# Patient Record
Sex: MALE | Race: WHITE | Employment: FULL TIME | URBAN - METROPOLITAN AREA
[De-identification: names, ages, dates, MRNs, and addresses within clinical notes are randomized per-mention and may not be internally consistent; named-entity substitution may affect disease eponyms.]

---

## 2023-02-14 ENCOUNTER — OFFICE VISIT (OUTPATIENT)
Dept: PRIMARY CARE CLINIC | Age: 48
End: 2023-02-14
Payer: COMMERCIAL

## 2023-02-14 VITALS
HEIGHT: 68 IN | DIASTOLIC BLOOD PRESSURE: 86 MMHG | RESPIRATION RATE: 16 BRPM | SYSTOLIC BLOOD PRESSURE: 122 MMHG | WEIGHT: 166 LBS | HEART RATE: 80 BPM | TEMPERATURE: 97.5 F | OXYGEN SATURATION: 98 % | BODY MASS INDEX: 25.16 KG/M2

## 2023-02-14 DIAGNOSIS — Z11.59 ENCOUNTER FOR HEPATITIS C SCREENING TEST FOR LOW RISK PATIENT: ICD-10-CM

## 2023-02-14 DIAGNOSIS — Z13.1 DIABETES MELLITUS SCREENING: ICD-10-CM

## 2023-02-14 DIAGNOSIS — Z12.11 SPECIAL SCREENING FOR MALIGNANT NEOPLASMS, COLON: ICD-10-CM

## 2023-02-14 DIAGNOSIS — Z23 IMMUNIZATION DUE: ICD-10-CM

## 2023-02-14 DIAGNOSIS — Z11.4 ENCOUNTER FOR SCREENING FOR HIV: ICD-10-CM

## 2023-02-14 DIAGNOSIS — Z13.220 SCREENING CHOLESTEROL LEVEL: ICD-10-CM

## 2023-02-14 DIAGNOSIS — Z00.00 HEALTHCARE MAINTENANCE: Primary | ICD-10-CM

## 2023-02-14 PROCEDURE — 90471 IMMUNIZATION ADMIN: CPT | Performed by: FAMILY MEDICINE

## 2023-02-14 PROCEDURE — G8484 FLU IMMUNIZE NO ADMIN: HCPCS | Performed by: FAMILY MEDICINE

## 2023-02-14 PROCEDURE — 99386 PREV VISIT NEW AGE 40-64: CPT | Performed by: FAMILY MEDICINE

## 2023-02-14 PROCEDURE — 90715 TDAP VACCINE 7 YRS/> IM: CPT | Performed by: FAMILY MEDICINE

## 2023-02-14 ASSESSMENT — PATIENT HEALTH QUESTIONNAIRE - PHQ9
SUM OF ALL RESPONSES TO PHQ QUESTIONS 1-9: 0
SUM OF ALL RESPONSES TO PHQ QUESTIONS 1-9: 0
SUM OF ALL RESPONSES TO PHQ9 QUESTIONS 1 & 2: 0
SUM OF ALL RESPONSES TO PHQ QUESTIONS 1-9: 0
1. LITTLE INTEREST OR PLEASURE IN DOING THINGS: 0
2. FEELING DOWN, DEPRESSED OR HOPELESS: 0
SUM OF ALL RESPONSES TO PHQ QUESTIONS 1-9: 0

## 2023-02-14 ASSESSMENT — ENCOUNTER SYMPTOMS
COUGH: 0
DIARRHEA: 0
SHORTNESS OF BREATH: 0
VOMITING: 0
RHINORRHEA: 0
BLOOD IN STOOL: 0
NAUSEA: 0

## 2023-02-14 NOTE — PATIENT INSTRUCTIONS
For help and support with [employer] hugo/portal set-up, please call [phone]. Zevia soda    Please find our [facility] below for your convenience! CENTRAL LOCATIONS    1) [address]  [address] [address] [place], [address]  Phone: [phone]    2) [address] [facility]. [address]  [facility], [address]  Phone: [place]    3) [address], [address]  [place] [address] [address]  Phone: [phone]    9) [facility], [address]  Phone: [phone]    5) [facility] [address]  [place] [address]  Phone: [address]    6) [address] [facility], [address] [facility], [address]  Phone: [phone]    7) Dario Angulo [address] [facility] [address]  Phone: [phone]) [facility]  [address]  [place], [address]  Phone: [phone]) [address] [place] [address] 189, [address]. [facility], [address] [facility]  Phone: [phone], [address]  Phone: [address] [place]    11) [facility]  [address], [address] [facility]  Phone: [phone] [facility]  3/3/2000 [address] [facility], [address] [facility] 429  Phone: [address]) [address] [facility] 15, [address]  [address], [address] [facility]  Phone: [phone]) [facility]   [facility], [address] [facility]  Phone: [phone]

## 2023-02-14 NOTE — ASSESSMENT & PLAN NOTE
Overall doing well. Age appropriate screenings and immunizations provided as per orders below. Completed paperwork to classify as low risk for diving - see scanned to Media.

## 2023-02-14 NOTE — PROGRESS NOTES
Hannah Galeana is a 53y.o. year old male here for:    Chief Complaint:    Chief Complaint   Patient presents with    [facility]    Annual Exam     Subjective: Today, his current concerns include:    Preventive Services:    Health Maintenance History:  Patient exercises regularly? Yes, martial arts 2X/week, weight training 1X/week and cardio 1-2X/week, at least 1 hour each time  Diet? Tries to eat a healthy diet, some soda, lots of water  Dental: Last visit was 3 months ago  Glasses/Eyes: Last visit was 1 year ago, has upcoming appt March 9 (about 2 weeks)    Other Health Maintenance History:  Sexually active? Yes, with one monogamous female partner  In the past two weeks have they been bothered by feeling \"down\", depressed or hopeless?  no  In the past two weeks, have they experienced a loss of interest or pleasure in doing things?  no  In the last year, have you fallen more than once or been seriously injured in a fall?  no  Advance Directives: Not in place. Provided instructions today on how to sign up/provide these on [employer]. Health Maintenance Screening:  Diabetes screening: was provided today  Cholesterol screening: was provided today  Prostate cancer screening order: was not applicable to this patient based on their risk factors and evidence based recommendations (no personal history of prostate CA, fam hx as noted) today. The natural history of prostate cancer and ongoing controversy regarding screening and potential treatment outcomes of prostate cancer has been discussed with the patient. The meaning of a false positive PSA and a false negative PSA has been discussed.    AAA Screening: was not applicable to this patient based on their risk factors and evidence based recommendations today  Colorectal cancer screening (Screening colonoscopy) order: was not applicable to this patient based on their risk factors and evidence based recommendations (no personal or family history of colon CA) today  Lung Cancer Screen:was not applicable to this patient based on their risk factors and evidence based recommendations today  Hep C screening: was provided today  HIV screening: was provided today    Immunizations:   Pneumonia vaccine: was not applicable to this patient based on their risk factors and evidence based recommendations today  TDAP vaccine: was provided today  Flu vaccine: declined today, counseling provided and advised that should she want this, we could provide at a later date. today  Hepatitis B vaccine: declined today, counseling provided and advised that should she want this, we could provide at a later date. today  Shingles vaccine: was not applicable to this patient based on their risk factors and evidence based recommendations today  HPV vaccine: was not applicable to this patient based on their risk factors and evidence based recommendations today    Functional Assessment  1. Do you have problems with hearing?  no  2. Do you have problems with vision?  no  3. Get Up and Go:  8 seconds   Pushes to get out of chair  no   Wide based gait  no   Unsteady/poor balance no   Fall Risk assessment low  (If there are issues regarding risk, these will be addressed in the assessment section.)    Past Medical History:   Diagnosis Date    Hx of concussion     at age 7     Social History     Tobacco Use    Smoking status: Never    Smokeless tobacco: Never   Vaping Use    Vaping Use: Never used   Substance Use Topics    Alcohol use: Yes     Comment: Very rarely    Drug use: Never     Family History   Problem Relation Age of Onset    Prostate Cancer Maternal Grandfather         Diagnosed in early 76s    Colon Cancer Neg Hx      Past Surgical History:   Procedure Laterality Date    HAND SURGERY Right     WISDOM TOOTH EXTRACTION       No current outpatient medications on file. No Known Allergies    Review of Systems:  Review of Systems   Constitutional:  Negative for chills, diaphoresis and fever.    HENT: Negative for congestion and rhinorrhea. Respiratory:  Negative for cough and shortness of breath. Cardiovascular:  Negative for chest pain. Gastrointestinal:  Negative for blood in stool, diarrhea, nausea and vomiting. Genitourinary:  Negative for difficulty urinating and hematuria. Skin:  Negative for rash. Neurological:  Negative for dizziness and syncope. Psychiatric/Behavioral:  Negative for dysphoric mood. Objective:    Physical Exam:  Vitals:    02/14/23 1554   BP: 122/86   Site: Right Upper Arm   Position: Sitting   Cuff Size: Medium Adult   Pulse: 80   Resp: 16   Temp: 97.5 °F (36.4 °C)   TempSrc: Temporal   SpO2: 98%   Weight: 166 lb (75.3 kg)   Height: 5' 8.25\" (1.734 m)     Physical Exam  Constitutional:       General: He is not in acute distress. Appearance: He is not ill-appearing, toxic-appearing or diaphoretic. HENT:      Head: Normocephalic and atraumatic. Right Ear: External ear normal.      Left Ear: External ear normal.   Eyes:      General: No scleral icterus. Cardiovascular:      Rate and Rhythm: Normal rate and regular rhythm. Heart sounds: Normal heart sounds. No murmur heard. No friction rub. No gallop. Pulmonary:      Effort: No respiratory distress. Breath sounds: No stridor. No wheezing, rhonchi or rales. Chest:      Chest wall: No tenderness. Skin:     General: Skin is warm and dry. Capillary Refill: Capillary refill takes less than 2 seconds. Neurological:      Mental Status: He is alert. Psychiatric:         Mood and Affect: Mood normal.         Behavior: Behavior normal.     Body mass index is 25.06 kg/m². Labs:  No results found for this or any previous visit (from the past 672 hour(s)). Imaging:  No orders to display     ASSESSMENT & PLAN:    Amadou Weber is a 53y.o. year old male here for an annual exam. The following is a summary of the plan made at this visit:    1.  Healthcare maintenance  Assessment & Plan:  Overall doing well. Age appropriate screenings and immunizations provided as per orders below. Completed paperwork to classify as low risk for diving - see scanned to Media. 2. Encounter for screening for HIV  Assessment & Plan:  Age appropriate screening provided as per orders below. Orders:  -     HIV Screen; Future  3. Encounter for hepatitis C screening test for low risk patient  Assessment & Plan:  Age appropriate screening provided as per orders below. Orders:  -     Hepatitis C Antibody; Future  4. Diabetes mellitus screening  Assessment & Plan:  Age appropriate screening provided as per orders below. Orders:  -     Comprehensive Metabolic Panel; Future  -     Hemoglobin A1C; Future  5. Screening cholesterol level  Assessment & Plan:  Age appropriate screening provided as per orders below. Orders:  -     Lipid Panel; Future  6. Special screening for malignant neoplasms, colon  Assessment & Plan:  Age appropriate screening provided as per orders below. Orders:  -     Lou Ruffin MD, Colorectal Surgery, [facility]  7. Immunization due  Assessment & Plan:  Age appropriate immunizations provided as per orders below. Orders:  -     Tdap, BOOSTRIX, (age 8 yrs+), IM    Education:  Routine anticipatory guidance provided. Other applicable patient education information provided in AVS.    Counseling  The patient was counseled regarding motor vehicle safety and sun exposure. If needed, the patient was counseled regarding diet and exercise. The patient was given information regarding the dangers of smoking and the overuse of alcohol. The patient was counseled regarding Living Will/Durable Power-Of-.

## 2023-03-02 ENCOUNTER — TELEPHONE (OUTPATIENT)
Dept: PRIMARY CARE CLINIC | Age: 48
End: 2023-03-02

## 2023-03-02 NOTE — TELEPHONE ENCOUNTER
Spoke with the patient, he has been travelling for work, will schedule colonoscopy when he is able to. He has their contact info. I will set reminder for 1 month from now, he was amenable to this.

## 2023-03-02 NOTE — TELEPHONE ENCOUNTER
Called patient to help facilitate colonoscopy scheduling. Called patient about this result, no answer received, message left asking for call back. Also left a msg with his wife asking him to call our office back. Routing to Annalise Galeas as RAJ.

## 2023-03-06 ENCOUNTER — TELEPHONE (OUTPATIENT)
Dept: SURGERY | Age: 48
End: 2023-03-06

## 2023-03-06 RX ORDER — POLYETHYLENE GLYCOL 3350, SODIUM SULFATE ANHYDROUS, SODIUM BICARBONATE, SODIUM CHLORIDE, POTASSIUM CHLORIDE 236; 22.74; 6.74; 5.86; 2.97 G/4L; G/4L; G/4L; G/4L; G/4L
POWDER, FOR SOLUTION ORAL
Qty: 1 EACH | Refills: 0 | Status: SHIPPED | OUTPATIENT
Start: 2023-03-06

## 2023-03-06 NOTE — TELEPHONE ENCOUNTER
Patient has been scheduled for:    Procedure:Colonoscopy   Date:5/3/23  Time:7:30 AM   Arrival:6:00 AM   [facility]  ASA? :None  Prep? Carlito, reviewed on phone, mailed     Pre-op? Day of     Post-op Appt? N/A     Patient advised they will need a . Orders faxed to surgery scheduling.     Instructions have been mailed/emailed to:   Douglas Castillo, [address]

## 2023-03-16 PROBLEM — Z00.00 HEALTHCARE MAINTENANCE: Status: RESOLVED | Noted: 2023-02-14 | Resolved: 2023-03-16

## 2023-03-16 PROBLEM — Z13.1 DIABETES MELLITUS SCREENING: Status: RESOLVED | Noted: 2023-02-14 | Resolved: 2023-03-16

## 2023-03-16 PROBLEM — Z11.4 ENCOUNTER FOR SCREENING FOR HIV: Status: RESOLVED | Noted: 2023-02-14 | Resolved: 2023-03-16

## 2023-03-16 PROBLEM — Z13.220 SCREENING CHOLESTEROL LEVEL: Status: RESOLVED | Noted: 2023-02-14 | Resolved: 2023-03-16

## 2023-03-16 PROBLEM — Z11.59 ENCOUNTER FOR HEPATITIS C SCREENING TEST FOR LOW RISK PATIENT: Status: RESOLVED | Noted: 2023-02-14 | Resolved: 2023-03-16

## 2023-03-16 PROBLEM — Z12.11 SPECIAL SCREENING FOR MALIGNANT NEOPLASMS, COLON: Status: RESOLVED | Noted: 2023-02-14 | Resolved: 2023-03-16

## 2023-04-25 ENCOUNTER — TELEPHONE (OUTPATIENT)
Dept: SURGERY | Age: 48
End: 2023-04-25

## 2023-04-25 DIAGNOSIS — Z12.12 ENCOUNTER FOR COLORECTAL CANCER SCREENING: Primary | ICD-10-CM

## 2023-04-25 DIAGNOSIS — Z12.11 ENCOUNTER FOR COLORECTAL CANCER SCREENING: Primary | ICD-10-CM

## 2023-04-25 RX ORDER — POLYETHYLENE GLYCOL 3350, SODIUM CHLORIDE, SODIUM BICARBONATE, POTASSIUM CHLORIDE 420; 11.2; 5.72; 1.48 G/4L; G/4L; G/4L; G/4L
POWDER, FOR SOLUTION ORAL
Qty: 1 EACH | Refills: 0 | Status: SHIPPED | OUTPATIENT
Start: 2023-04-25

## 2023-04-25 NOTE — TELEPHONE ENCOUNTER
Patient called to ask  to please submit the order for the prep, Golytely. Pharmacy on file has been verified; [facility] [address] [facility] and [address]    When submitted, please call the patient at [phone] to let him know when to pick it up.

## 2023-05-02 ENCOUNTER — TELEPHONE (OUTPATIENT)
Dept: SURGERY | Age: 48
End: 2023-05-02

## 2023-05-02 ENCOUNTER — ANESTHESIA EVENT (OUTPATIENT)
Dept: ENDOSCOPY | Age: 48
End: 2023-05-02
Payer: COMMERCIAL

## 2023-05-02 NOTE — TELEPHONE ENCOUNTER
I have placed a reminder call to patient for upcoming procedure. Did you speak directly to patient or leave a voicemail? Voicemail    Prep? Golytely    Must have a  that is over the age of 25. Must be a friend or family member that can be responsible for signing them out after surgery.     Arrive at the main entrance [facility] at 7:30am

## 2023-05-03 ENCOUNTER — HOSPITAL ENCOUNTER (OUTPATIENT)
Age: 48
Setting detail: OUTPATIENT SURGERY
Discharge: HOME | End: 2023-05-03
Attending: SURGERY | Admitting: SURGERY
Payer: COMMERCIAL

## 2023-05-03 ENCOUNTER — ANESTHESIA (OUTPATIENT)
Dept: ENDOSCOPY | Age: 48
End: 2023-05-03
Payer: COMMERCIAL

## 2023-05-03 VITALS
SYSTOLIC BLOOD PRESSURE: 134 MMHG | BODY MASS INDEX: 23.25 KG/M2 | HEART RATE: 88 BPM | OXYGEN SATURATION: 100 % | HEIGHT: 69 IN | WEIGHT: 157 LBS | RESPIRATION RATE: 18 BRPM | DIASTOLIC BLOOD PRESSURE: 96 MMHG | TEMPERATURE: 97 F

## 2023-05-03 PROBLEM — Z12.12 SCREENING FOR COLORECTAL CANCER: Status: ACTIVE | Noted: 2023-02-14

## 2023-05-03 PROCEDURE — 2500000003 HC RX 250 WO HCPCS: Performed by: NURSE ANESTHETIST, CERTIFIED REGISTERED

## 2023-05-03 PROCEDURE — 3609027000 HC COLONOSCOPY: Performed by: SURGERY

## 2023-05-03 PROCEDURE — 7100000011 HC PHASE II RECOVERY - ADDTL 15 MIN: Performed by: SURGERY

## 2023-05-03 PROCEDURE — 3700000001 HC ADD 15 MINUTES (ANESTHESIA): Performed by: SURGERY

## 2023-05-03 PROCEDURE — 2709999900 HC NON-CHARGEABLE SUPPLY: Performed by: SURGERY

## 2023-05-03 PROCEDURE — 2580000003 HC RX 258: Performed by: ANESTHESIOLOGY

## 2023-05-03 PROCEDURE — 7100000010 HC PHASE II RECOVERY - FIRST 15 MIN: Performed by: SURGERY

## 2023-05-03 PROCEDURE — 45378 DIAGNOSTIC COLONOSCOPY: CPT | Performed by: SURGERY

## 2023-05-03 PROCEDURE — 6360000002 HC RX W HCPCS: Performed by: NURSE ANESTHETIST, CERTIFIED REGISTERED

## 2023-05-03 PROCEDURE — 3700000000 HC ANESTHESIA ATTENDED CARE: Performed by: SURGERY

## 2023-05-03 RX ORDER — SODIUM CHLORIDE, SODIUM LACTATE, POTASSIUM CHLORIDE, CALCIUM CHLORIDE 600; 310; 30; 20 MG/100ML; MG/100ML; MG/100ML; MG/100ML
INJECTION, SOLUTION INTRAVENOUS CONTINUOUS
Status: DISCONTINUED | OUTPATIENT
Start: 2023-05-03 | End: 2023-05-03 | Stop reason: HOSPADM

## 2023-05-03 RX ORDER — LIDOCAINE HYDROCHLORIDE 20 MG/ML
INJECTION, SOLUTION INFILTRATION; PERINEURAL PRN
Status: DISCONTINUED | OUTPATIENT
Start: 2023-05-03 | End: 2023-05-03 | Stop reason: SDUPTHER

## 2023-05-03 RX ORDER — PROPOFOL 10 MG/ML
INJECTION, EMULSION INTRAVENOUS CONTINUOUS PRN
Status: DISCONTINUED | OUTPATIENT
Start: 2023-05-03 | End: 2023-05-03 | Stop reason: SDUPTHER

## 2023-05-03 RX ORDER — PROPOFOL 10 MG/ML
INJECTION, EMULSION INTRAVENOUS PRN
Status: DISCONTINUED | OUTPATIENT
Start: 2023-05-03 | End: 2023-05-03 | Stop reason: SDUPTHER

## 2023-05-03 RX ORDER — GLYCOPYRROLATE 1 MG/5 ML
SYRINGE (ML) INTRAVENOUS PRN
Status: DISCONTINUED | OUTPATIENT
Start: 2023-05-03 | End: 2023-05-03 | Stop reason: SDUPTHER

## 2023-05-03 RX ADMIN — PROPOFOL 50 MG: 10 INJECTION, EMULSION INTRAVENOUS at 08:50

## 2023-05-03 RX ADMIN — LIDOCAINE HYDROCHLORIDE 100 MG: 20 INJECTION, SOLUTION INFILTRATION; PERINEURAL at 08:50

## 2023-05-03 RX ADMIN — Medication 0.3 MG: at 08:55

## 2023-05-03 RX ADMIN — SODIUM CHLORIDE, POTASSIUM CHLORIDE, SODIUM LACTATE AND CALCIUM CHLORIDE: 600; 310; 30; 20 INJECTION, SOLUTION INTRAVENOUS at 08:37

## 2023-05-03 RX ADMIN — PROPOFOL 180 MCG/KG/MIN: 10 INJECTION, EMULSION INTRAVENOUS at 08:50

## 2023-05-03 ASSESSMENT — PAIN - FUNCTIONAL ASSESSMENT: PAIN_FUNCTIONAL_ASSESSMENT: 0-10

## 2023-05-03 ASSESSMENT — PAIN SCALES - GENERAL: PAINLEVEL_OUTOF10: 0

## 2023-05-03 NOTE — ANESTHESIA POSTPROCEDURE EVALUATION
Department of Anesthesiology  Postprocedure Note    Patient: Harika Mckinney  MRN: [id number]  YOB: 1975  Date of evaluation: 5/3/2023      Procedure Summary     Date: 05/03/23 Room / Location: [facility]    Anesthesia Start: 0134 Anesthesia Stop: 8445    Procedure: DIAGNOSTIC COLONOSCOPY Diagnosis:       Colon cancer screening      (Colon cancer screening [Z12.11])    Surgeons: Robert Ramos MD Responsible Provider: Kellie Nugent MD    Anesthesia Type: MAC ASA Status: 1          Anesthesia Type: No value filed.     Elke Phase I: Elke Score: 10    Elke Phase II: Elke Score: 7      Anesthesia Post Evaluation    Patient location during evaluation: PACU  Patient participation: complete - patient participated  Level of consciousness: awake and alert  Airway patency: patent  Nausea & Vomiting: no nausea and no vomiting  Complications: no  Cardiovascular status: hemodynamically stable  Respiratory status: acceptable  Hydration status: euvolemic  Multimodal analgesia pain management approach

## 2023-05-03 NOTE — H&P
PRE-ENDOSCOPY H&P    Visit Date: 5/3/2023    History:     Torsten Vilchis is a 53 y.o. male who presents today for colonoscopy procedure. See A/P below for indications. Patient Active Problem List   Diagnosis    Immunization due     Scheduled Meds:  Continuous Infusions:   lactated ringers IV soln 125 mL/hr at 05/03/23 0847     PRN Meds:. Prior to Admission medications    Medication Sig Start Date End Date Taking? Authorizing Provider   polyethylene glycol-electrolytes (NULYTELY) 420 g solution TAKE AS DIRECTED DAY PRIOR TO COLONOSCOPY 4/25/23   Kip Montesinos MD   polyethylene glycol (GOLYTELY) 236 g solution Prepare solution according to packaging instructions. 3/6/23   Kip Montesinos MD     No Known Allergies  Past Medical History:   Diagnosis Date    Dental crown present     Hx of concussion     at age 7     Past Surgical History:   Procedure Laterality Date    HAND SURGERY Right     WISDOM TOOTH EXTRACTION           Physical Exam:     BP (!) 136/91   Pulse 77   Temp 97.8 °F (36.6 °C) (Temporal)   Resp 14   Ht 5' 9\" (1.753 m)   Wt 157 lb (71.2 kg)   SpO2 100%   BMI 23.18 kg/m²  Body mass index is 23.18 kg/m². Constitutional: Appears well-developed and well-nourished. Grooming appropriate. Head: Normocephalic, atraumatic. Eyes: No scleral icterus. Vision intact grossly. ENT: Hearing grossly intact. No facial deformity. Cardiovascular: Normal rate on monitor. Pulmonary/Chest: Effort normal. No respiratory distress. No wheezes. No use of accessory muscles. Musculoskeletal: Normal range of motion of UE. No gross deformity. Neurological: Alert and oriented to person, place, and time. No gross deficits. Psychiatric: Normal mood and affect. Behavior normal. Oriented to person, place, and time.   Abdomen: soft, NTTP, non distended    Recent labs/radiology reviewed as appropriate    Assessment/Plan:     Referred by PCP for screening colonoscopy    Previous colonoscopy: no  Family history of colorectal

## 2023-05-03 NOTE — PROGRESS NOTES
Ambulatory Surgery/Procedure Discharge Note    Vitals:    05/03/23 0930   BP: (!) 134/96   Pulse: 88   Resp: 18   Temp:    SpO2: 100%     Patient meets criteria for discharge per Elke score. In: 600 [I.V.:600]  Out: -     Restroom use offered before discharge. Yes    Pain assessment:  none  Pain Level: 0    Pt and wife states \"ready to go home\". Pt alert and oriented x4. IV removed. Denies N/V or pain. Discharge instructions given to pt and wife with pt permission. Patient tolerating PO. Pt and wife verbalized understanding of all instructions. Left with all belongings and discharge instructions. Patient discharged to home/self care. Patient discharged via wheel chair by transporter to waiting family.

## 2023-05-03 NOTE — DISCHARGE INSTRUCTIONS
ENDOSCOPY DISCHARGE INSTRUCTIONS:    Call the physician that did your procedure for any questions or concerns:           DR. [place]:  [phone]      ACTIVITY:    There are potential side effects from the medications used for sedation and anesthesia during your procedure. These include:  Dizziness or light-headedness, confusion or memory loss, delayed reaction times, loss of coordination, nausea and vomiting. Because of your increased risk for injury, we ask that you observe the following precautions: For the next 24 hours,  DO NOT operate an automobile, bicycle, motorcycle, , power tools or large equipment of any kind. Do not drink alcohol, sign any legal documents or make any legal decisions for 24 hours. Do not bend your head over lower than your heart. DO sit on the side of bed/couch awhile before getting up. Plan on bedrest or quiet relaxation today. You may resume normal activities in 24 hours. DIET:    Your first meal today should be light, avoiding spicy and fatty foods. If you tolerate this first meal, then you may advance to your regular diet unless otherwise advised by your physician. NORMAL SYMPTOMS:    Gaseous discomfort: belching or flatus        NOTIFY YOUR PHYSICIAN IF THESE SYMPTOMS OCCUR:  1. Fever (greater than 100)  5. Increased abdominal bloating  2. Severe pain    6. Excessive bleeding  3. Nausea and vomiting  7. Chest pain                                                                    4. Chills    8. Shortness of breath       Please review these discharge instructions this evening or tomorrow for   information you may have forgotten. We want to thank you for choosing the [facility] as your health care provider. We always strive to provide you with excellent care while you are here. You may receive a survey in the mail regarding your care. We would appreciate you taking a few minutes of your time to complete this survey.  Again,

## 2023-05-03 NOTE — ANESTHESIA PRE PROCEDURE
WBC, RBC, HGB, HCT, MCV, RDW, PLT    CMP: No results found for: NA, K, CL, CO2, BUN, CREATININE, GFRAA, AGRATIO, LABGLOM, GLUCOSE, GLU, PROT, CALCIUM, BILITOT, ALKPHOS, AST, ALT    POC Tests: No results for input(s): POCGLU, POCNA, POCK, POCCL, POCBUN, POCHEMO, POCHCT in the last 72 hours. Coags: No results found for: PROTIME, INR, APTT    HCG (If Applicable): No results found for: PREGTESTUR, PREGSERUM, HCG, HCGQUANT     ABGs: No results found for: PHART, PO2ART, BFD7KYN, BUZ5KPJ, BEART, F2FYIHZT     Type & Screen (If Applicable):  No results found for: LABABO, LABRH    Drug/Infectious Status (If Applicable):  No results found for: HIV, HEPCAB    COVID-19 Screening (If Applicable): No results found for: COVID19        Anesthesia Evaluation  Patient summary reviewed and Nursing notes reviewed no history of anesthetic complications:   Airway: Mallampati: II  TM distance: >3 FB   Neck ROM: full  Mouth opening: > = 3 FB   Dental: normal exam         Pulmonary:Negative Pulmonary ROS and normal exam                               Cardiovascular:Negative CV ROS                      Neuro/Psych:   Negative Neuro/Psych ROS              GI/Hepatic/Renal: Neg GI/Hepatic/Renal ROS            Endo/Other: Negative Endo/Other ROS                    Abdominal:             Vascular: negative vascular ROS. Other Findings:           Anesthesia Plan      MAC     ASA 1       Induction: intravenous. MIPS: Prophylactic antiemetics administered. Anesthetic plan and risks discussed with patient. Plan discussed with CRNA.     Attending anesthesiologist reviewed and agrees with Preprocedure content                Alfredo Jiménez MD   5/3/2023

## 2023-06-02 PROBLEM — Z12.11 SCREENING FOR COLORECTAL CANCER: Status: RESOLVED | Noted: 2023-02-14 | Resolved: 2023-06-02

## 2023-06-02 PROBLEM — Z12.12 SCREENING FOR COLORECTAL CANCER: Status: RESOLVED | Noted: 2023-02-14 | Resolved: 2023-06-02

## 2024-01-19 ENCOUNTER — TELEPHONE (OUTPATIENT)
Dept: PRIMARY CARE CLINIC | Age: 49
End: 2024-01-19

## 2024-01-19 NOTE — TELEPHONE ENCOUNTER
Left message for pt to call office to reschedule his annual appointment (2/15/24 @ 4pm) due to office hours changing.

## 2024-01-22 NOTE — TELEPHONE ENCOUNTER
Pt has rescheduled annual appt through the [facility].  Pt is scheduled for 2/20/24.

## 2024-02-20 ENCOUNTER — OFFICE VISIT (OUTPATIENT)
Dept: PRIMARY CARE CLINIC | Age: 49
End: 2024-02-20
Payer: COMMERCIAL

## 2024-02-20 VITALS
DIASTOLIC BLOOD PRESSURE: 70 MMHG | HEIGHT: 69 IN | SYSTOLIC BLOOD PRESSURE: 106 MMHG | OXYGEN SATURATION: 96 % | HEART RATE: 73 BPM | RESPIRATION RATE: 18 BRPM | TEMPERATURE: 97.2 F | WEIGHT: 167 LBS | BODY MASS INDEX: 24.73 KG/M2

## 2024-02-20 DIAGNOSIS — Z00.00 HEALTHCARE MAINTENANCE: Primary | ICD-10-CM

## 2024-02-20 DIAGNOSIS — R10.13 EPIGASTRIC PAIN: ICD-10-CM

## 2024-02-20 PROBLEM — H52.13 MYOPIA OF BOTH EYES: Status: ACTIVE | Noted: 2023-03-09

## 2024-02-20 PROCEDURE — G8484 FLU IMMUNIZE NO ADMIN: HCPCS | Performed by: FAMILY MEDICINE

## 2024-02-20 PROCEDURE — 99396 PREV VISIT EST AGE 40-64: CPT | Performed by: FAMILY MEDICINE

## 2024-02-20 SDOH — ECONOMIC STABILITY: INCOME INSECURITY: HOW HARD IS IT FOR YOU TO PAY FOR THE VERY BASICS LIKE FOOD, HOUSING, MEDICAL CARE, AND HEATING?: NOT HARD AT ALL

## 2024-02-20 SDOH — ECONOMIC STABILITY: FOOD INSECURITY: WITHIN THE PAST 12 MONTHS, THE FOOD YOU BOUGHT JUST DIDN'T LAST AND YOU DIDN'T HAVE MONEY TO GET MORE.: NEVER TRUE

## 2024-02-20 SDOH — ECONOMIC STABILITY: HOUSING INSECURITY
IN THE LAST 12 MONTHS, WAS THERE A TIME WHEN YOU DID NOT HAVE A STEADY PLACE TO SLEEP OR SLEPT IN A SHELTER (INCLUDING NOW)?: NO

## 2024-02-20 SDOH — ECONOMIC STABILITY: FOOD INSECURITY: WITHIN THE PAST 12 MONTHS, YOU WORRIED THAT YOUR FOOD WOULD RUN OUT BEFORE YOU GOT MONEY TO BUY MORE.: NEVER TRUE

## 2024-02-20 ASSESSMENT — PATIENT HEALTH QUESTIONNAIRE - PHQ9
1. LITTLE INTEREST OR PLEASURE IN DOING THINGS: 0
2. FEELING DOWN, DEPRESSED OR HOPELESS: 0
SUM OF ALL RESPONSES TO PHQ QUESTIONS 1-9: 0
SUM OF ALL RESPONSES TO PHQ9 QUESTIONS 1 & 2: 0
SUM OF ALL RESPONSES TO PHQ QUESTIONS 1-9: 0

## 2024-02-20 ASSESSMENT — ENCOUNTER SYMPTOMS
ABDOMINAL PAIN: 1
VOMITING: 0
NAUSEA: 0
SHORTNESS OF BREATH: 0
BLOOD IN STOOL: 0
COUGH: 0
RHINORRHEA: 0
DIARRHEA: 0

## 2024-02-20 NOTE — PROGRESS NOTES
is no abdominal tenderness. There is no guarding or rebound.      Hernia: No hernia is present.   Skin:     General: Skin is warm and dry.      Capillary Refill: Capillary refill takes less than 2 seconds.   Neurological:      Mental Status: He is alert.   Psychiatric:         Mood and Affect: Mood normal.         Behavior: Behavior normal.       Body mass index is 24.66 kg/m².    Labs:  No results found for this or any previous visit (from the past 672 hour(s)).    Imaging:  No orders to display     ASSESSMENT & PLAN:    Timothy Pham is a 49 y.o. year old male here for an annual exam. The following is a summary of the plan made at this visit:    1. Healthcare maintenance  Assessment & Plan:  Overall doing well. Please see other problems addressed today as otherwise noted.  2. Epigastric pain  Assessment & Plan:  Poorly controlled. ?Gastritis. No TTP on exam. Gastric ulcer also on Ddx as well as GERD. Will test for H. Pylori - advised no treatment prior to testing. GERD sheet provided and he will try OTC measures. If not improved will send to GI. He was amenable to this plan. Call back/ED precautions discussed.    Orders:  -     H. PYLORI ANTIGEN, STOOL; Future    Education:  Routine anticipatory guidance provided. Other applicable patient education information provided in AVS.    Counseling  The patient was counseled regarding motor vehicle safety and sun exposure.  If needed, the patient was counseled regarding diet and exercise.  The patient was given information regarding the dangers of smoking and the overuse of alcohol.  The patient was counseled regarding Living Will/Durable Power-Of-.

## 2024-02-20 NOTE — PATIENT INSTRUCTIONS
Please find our [facility] Location Guide below for your convenience!    CENTRAL LOCATIONS    1) [facility]  [address]  [place], [place] [address]  Phone: [phone]    2) [facility]  [address], [place] [address]  Phone: [phone]    [facility] LOCATIONS    3) [facility]  [address]  [place], [place] [address]  Phone: [phone]    4) [facility]  [address] [address]  Phone: [phone]    5) [facility]  [address] [place] [address]  Phone: [phone]    [place] LOCATIONS    6) [facility]  [address]  [place] [address]  Phone: [phone]    7) [facility]  [address] [place] [address]  Phone: [phone]    8) [facility]  [address] [place] [address]  Phone: [phone]    9) [facility]  [address]  [place] [address]  Phone: [phone]    [place] LOCATIONS    10) [facility]  [address]  [place] [address]  Phone: [phone]    11) [facility]  [address] [facility] [address] [facility] [address]  [place] [address]  Phone: [phone]    [facility] LOCATIONS    12) [facility]  [address]  [place] [address]  Phone: [phone]    13) [place] [facility]  [address] [place] [address]  Phone: [phone]

## 2024-02-20 NOTE — ASSESSMENT & PLAN NOTE
Poorly controlled. ?Gastritis. No TTP on exam. Gastric ulcer also on Ddx as well as GERD. Will test for H. Pylori - advised no treatment prior to testing. GERD sheet provided and he will try OTC measures. If not improved will send to GI. He was amenable to this plan. Call back/ED precautions discussed.

## 2024-03-21 PROBLEM — Z00.00 HEALTHCARE MAINTENANCE: Status: RESOLVED | Noted: 2023-02-14 | Resolved: 2024-03-21

## 2025-02-27 ENCOUNTER — OFFICE VISIT (OUTPATIENT)
Dept: PRIMARY CARE CLINIC | Age: 50
End: 2025-02-27
Payer: COMMERCIAL

## 2025-02-27 VITALS
HEIGHT: 69 IN | SYSTOLIC BLOOD PRESSURE: 116 MMHG | TEMPERATURE: 98.6 F | HEART RATE: 95 BPM | RESPIRATION RATE: 17 BRPM | DIASTOLIC BLOOD PRESSURE: 68 MMHG | WEIGHT: 169 LBS | BODY MASS INDEX: 25.03 KG/M2 | OXYGEN SATURATION: 99 %

## 2025-02-27 DIAGNOSIS — Z13.1 DIABETES MELLITUS SCREENING: ICD-10-CM

## 2025-02-27 DIAGNOSIS — Z00.00 HEALTHCARE MAINTENANCE: Primary | ICD-10-CM

## 2025-02-27 DIAGNOSIS — Z13.220 SCREENING CHOLESTEROL LEVEL: ICD-10-CM

## 2025-02-27 DIAGNOSIS — R10.13 EPIGASTRIC PAIN: ICD-10-CM

## 2025-02-27 PROCEDURE — 99396 PREV VISIT EST AGE 40-64: CPT | Performed by: FAMILY MEDICINE

## 2025-02-27 RX ORDER — FAMOTIDINE 40 MG/1
40 TABLET, FILM COATED ORAL NIGHTLY PRN
COMMUNITY

## 2025-02-27 SDOH — ECONOMIC STABILITY: FOOD INSECURITY: WITHIN THE PAST 12 MONTHS, THE FOOD YOU BOUGHT JUST DIDN'T LAST AND YOU DIDN'T HAVE MONEY TO GET MORE.: NEVER TRUE

## 2025-02-27 SDOH — ECONOMIC STABILITY: FOOD INSECURITY: WITHIN THE PAST 12 MONTHS, YOU WORRIED THAT YOUR FOOD WOULD RUN OUT BEFORE YOU GOT MONEY TO BUY MORE.: NEVER TRUE

## 2025-02-27 ASSESSMENT — PATIENT HEALTH QUESTIONNAIRE - PHQ9
SUM OF ALL RESPONSES TO PHQ QUESTIONS 1-9: 0
SUM OF ALL RESPONSES TO PHQ QUESTIONS 1-9: 0
2. FEELING DOWN, DEPRESSED OR HOPELESS: NOT AT ALL
SUM OF ALL RESPONSES TO PHQ QUESTIONS 1-9: 0
SUM OF ALL RESPONSES TO PHQ9 QUESTIONS 1 & 2: 0
SUM OF ALL RESPONSES TO PHQ QUESTIONS 1-9: 0
1. LITTLE INTEREST OR PLEASURE IN DOING THINGS: NOT AT ALL

## 2025-02-27 ASSESSMENT — ENCOUNTER SYMPTOMS
ABDOMINAL PAIN: 1
NAUSEA: 0
SHORTNESS OF BREATH: 0
RHINORRHEA: 0
BLOOD IN STOOL: 0
DIARRHEA: 0
VOMITING: 0
COUGH: 0

## 2025-02-27 NOTE — PATIENT INSTRUCTIONS
If you drink soda, consider a soda that has Stevia as the sweetener (and in fact, Stevia if a sweetener is needed to be added to food/drink). Examples of these sodas are Zevia or Olipop.     Please find our [facility] Lab Location Guide below for your convenience!    CENTRAL LOCATIONS    1) [facility]  [address]  [place], [place] [address]  Phone: [phone]    2) [facility]  [address], [place] [address]  Phone: [phone]    [facility] LOCATIONS    3) [facility]  [address], [address]  [place], [place] [address]  Phone: [phone]    4) [facility]  [address] [place] [address]  Phone: [phone]    5) [facility]  [address] [place] [address]  Phone: [phone]    6) [facility]  [address] [place] [address]  [place] [address]   Phone: [phone]    [place] LOCATIONS    7) [facility]  [address]  [place] [address]  Phone: [phone]    8) [facility]  [address] [place] [address]  Phone: [phone]    9) [facility]  [address] [place] [address]  Phone: [phone]    10) [facility]  [address], [address]  [place] [address]  Phone: [phone]    [place] LOCATIONS    11) [facility]  [address]  [place] [address]  Phone: [phone]    12) [facility]  [address]  [place] [address]  Phone: [phone]    [facility] LOCATIONS    13) [facility]  [address], [address]  [place] [address]  Phone: [phone]    14) [facility]  [address] [place] [address]  Phone: [phone]

## 2025-02-27 NOTE — ASSESSMENT & PLAN NOTE
Overall doing well. Age appropriate screening provided as per orders below. Other problems addressed today as otherwise noted.

## 2025-02-27 NOTE — PROGRESS NOTES
Timothy Pham is a 50 y.o. year old male here for:    Chief Complaint:    Chief Complaint   Patient presents with    Annual Exam     Subjective:    Today, his current concerns include:    Preventive Services:    Health Maintenance History:  Patient exercises regularly? Yes, 4-5X/week, 30 mins at least each time  Diet? Tries to eat a healthy diet, rare soda, lots of water   Dental: Last visit was earlier this week  Glasses/Eyes: Last visit was less than 1 year ago    Other Health Maintenance History:  Sexually active? Yes with one monogamous female partner  In the past two weeks have they been bothered by feeling \"down\", depressed or hopeless?  no  In the past two weeks, have they experienced a loss of interest or pleasure in doing things?  no  In the last year, have you fallen more than once or been seriously injured in a fall?  no  Advance Directives: Not in place. Provided instructions today on how to sign up/provide these on [employer].    Health Maintenance Screening:  Diabetes screening: was provided today  Cholesterol screening: was provided today   Prostate cancer screening order: was not applicable to this patient based on their risk factors and evidence based recommendations (no personal history of prostate CA, family hx as noted) today. The natural history of prostate cancer and ongoing controversy regarding screening and potential treatment outcomes of prostate cancer has been discussed with the patient. The meaning of a false positive PSA and a false negative PSA has been discussed. He indicates understanding of the limitations of this screening test and wishes  to proceed with screening PSA testing.  AAA Screening: was not applicable to this patient based on their risk factors and evidence based recommendations today  Colorectal cancer screening (Screening colonoscopy) order: was not provided (no personal or family history of colon CA) today  Lung Cancer Screen:was not applicable to this patient based on

## 2025-02-27 NOTE — ASSESSMENT & PLAN NOTE
Poorly controlled. ?Gastritis vs GERD. Gastric ulcer also on Ddx. Will once more provide test for H. Pylori - reminded no treatment with medications (OTC) prior to testing. GERD sheet previously provided. If not improved will send to GI. He was amenable to this plan. Call back/ED precautions discussed.

## 2025-03-15 DIAGNOSIS — Z13.1 DIABETES MELLITUS SCREENING: ICD-10-CM

## 2025-03-15 DIAGNOSIS — R10.13 EPIGASTRIC PAIN: ICD-10-CM

## 2025-03-15 LAB
ALBUMIN SERPL-MCNC: 4.4 G/DL (ref 3.4–5)
ALBUMIN/GLOB SERPL: 2.1 {RATIO} (ref 1.1–2.2)
ALP SERPL-CCNC: 65 U/L (ref 40–129)
ALT SERPL-CCNC: 36 U/L (ref 10–40)
ANION GAP SERPL CALCULATED.3IONS-SCNC: 10 MMOL/L (ref 3–16)
AST SERPL-CCNC: 26 U/L (ref 15–37)
BILIRUB SERPL-MCNC: 0.6 MG/DL (ref 0–1)
BUN SERPL-MCNC: 15 MG/DL (ref 7–20)
CALCIUM SERPL-MCNC: 9.3 MG/DL (ref 8.3–10.6)
CHLORIDE SERPL-SCNC: 104 MMOL/L (ref 99–110)
CHOLEST SERPL-MCNC: 160 MG/DL (ref 0–199)
CO2 SERPL-SCNC: 26 MMOL/L (ref 21–32)
CREAT SERPL-MCNC: 1 MG/DL (ref 0.9–1.3)
GFR SERPLBLD CREATININE-BSD FMLA CKD-EPI: >90 ML/MIN/{1.73_M2}
GLUCOSE SERPL-MCNC: 87 MG/DL (ref 70–99)
HDLC SERPL-MCNC: 44 MG/DL (ref 40–60)
LDLC SERPL CALC-MCNC: 77 MG/DL
LIPASE SERPL-CCNC: 37 U/L (ref 13–60)
POTASSIUM SERPL-SCNC: 4.7 MMOL/L (ref 3.5–5.1)
PROT SERPL-MCNC: 6.5 G/DL (ref 6.4–8.2)
SODIUM SERPL-SCNC: 140 MMOL/L (ref 136–145)
TRIGL SERPL-MCNC: 197 MG/DL (ref 0–150)
VLDLC SERPL CALC-MCNC: 39 MG/DL

## 2025-03-16 LAB
EST. AVERAGE GLUCOSE BLD GHB EST-MCNC: 93.9 MG/DL
HBA1C MFR BLD: 4.9 %

## 2025-03-17 ENCOUNTER — RESULTS FOLLOW-UP (OUTPATIENT)
Dept: PRIMARY CARE CLINIC | Age: 50
End: 2025-03-17

## 2025-03-17 DIAGNOSIS — R10.13 EPIGASTRIC PAIN: ICD-10-CM

## 2025-03-20 LAB — H PYLORI AG STL QL IA: NEGATIVE

## 2025-03-29 PROBLEM — Z00.00 HEALTHCARE MAINTENANCE: Status: RESOLVED | Noted: 2023-02-14 | Resolved: 2025-03-29

## 2025-03-29 PROBLEM — Z13.220 SCREENING CHOLESTEROL LEVEL: Status: RESOLVED | Noted: 2023-02-14 | Resolved: 2025-03-29

## 2025-03-29 PROBLEM — Z13.1 DIABETES MELLITUS SCREENING: Status: RESOLVED | Noted: 2023-02-14 | Resolved: 2025-03-29

## (undated) DEVICE — CANNULA SAMP CO2 AD GRN 7FT CO2 AND 7FT O2 TBNG UNIV CONN